# Patient Record
Sex: MALE | Race: WHITE | URBAN - METROPOLITAN AREA
[De-identification: names, ages, dates, MRNs, and addresses within clinical notes are randomized per-mention and may not be internally consistent; named-entity substitution may affect disease eponyms.]

---

## 2017-01-12 ENCOUNTER — HISTORICAL (OUTPATIENT)
Dept: ADMINISTRATIVE | Facility: HOSPITAL | Age: 57
End: 2017-01-12

## 2018-04-30 ENCOUNTER — HOSPITAL ENCOUNTER (OUTPATIENT)
Dept: EMERGENCY MEDICINE | Facility: HOSPITAL | Age: 58
End: 2018-04-30
Attending: INTERNAL MEDICINE | Admitting: INTERNAL MEDICINE

## 2018-04-30 LAB
ABS NEUT (OLG): 4.97 X10(3)/MCL (ref 2.1–9.2)
ALBUMIN SERPL-MCNC: 4 GM/DL (ref 3.4–5)
ALBUMIN/GLOB SERPL: 1.1 RATIO (ref 1.1–2)
ALP SERPL-CCNC: 72 UNIT/L (ref 50–136)
ALT SERPL-CCNC: 34 UNIT/L (ref 12–78)
APTT PPP: 30.3 SECOND(S) (ref 24.8–36.9)
AST SERPL-CCNC: 25 UNIT/L (ref 15–37)
BASOPHILS # BLD AUTO: 0 X10(3)/MCL (ref 0–0.2)
BASOPHILS NFR BLD AUTO: 1 %
BILIRUB SERPL-MCNC: 1.6 MG/DL (ref 0.2–1)
BILIRUBIN DIRECT+TOT PNL SERPL-MCNC: 0.2 MG/DL (ref 0–0.5)
BILIRUBIN DIRECT+TOT PNL SERPL-MCNC: 1.4 MG/DL (ref 0–0.8)
BUN SERPL-MCNC: 13 MG/DL (ref 7–18)
CALCIUM SERPL-MCNC: 9.6 MG/DL (ref 8.5–10.1)
CHLORIDE SERPL-SCNC: 106 MMOL/L (ref 98–107)
CO2 SERPL-SCNC: 20 MMOL/L (ref 21–32)
CREAT SERPL-MCNC: 1.7 MG/DL (ref 0.7–1.3)
EOSINOPHIL # BLD AUTO: 0.1 X10(3)/MCL (ref 0–0.9)
EOSINOPHIL NFR BLD AUTO: 2 %
ERYTHROCYTE [DISTWIDTH] IN BLOOD BY AUTOMATED COUNT: 12.4 % (ref 11.5–17)
GLOBULIN SER-MCNC: 3.5 GM/DL (ref 2.4–3.5)
GLUCOSE SERPL-MCNC: 138 MG/DL (ref 74–106)
HCT VFR BLD AUTO: 43.7 % (ref 42–52)
HGB BLD-MCNC: 14.7 GM/DL (ref 14–18)
INR PPP: 0.99 (ref 0–1.27)
LYMPHOCYTES # BLD AUTO: 1.7 X10(3)/MCL (ref 0.6–4.6)
LYMPHOCYTES NFR BLD AUTO: 23 %
MCH RBC QN AUTO: 29.7 PG (ref 27–31)
MCHC RBC AUTO-ENTMCNC: 33.6 GM/DL (ref 33–36)
MCV RBC AUTO: 88.3 FL (ref 80–94)
MONOCYTES # BLD AUTO: 0.6 X10(3)/MCL (ref 0.1–1.3)
MONOCYTES NFR BLD AUTO: 9 %
NEUTROPHILS # BLD AUTO: 4.97 X10(3)/MCL (ref 2.1–9.2)
NEUTROPHILS NFR BLD AUTO: 66 %
PLATELET # BLD AUTO: 253 X10(3)/MCL (ref 130–400)
PMV BLD AUTO: 10.1 FL (ref 9.4–12.4)
POC TROPONIN: 0 NG/ML (ref 0–0.08)
POTASSIUM SERPL-SCNC: 3.9 MMOL/L (ref 3.5–5.1)
PROT SERPL-MCNC: 7.5 GM/DL (ref 6.4–8.2)
PROTHROMBIN TIME: 13.4 SECOND(S) (ref 12.2–14.7)
RBC # BLD AUTO: 4.95 X10(6)/MCL (ref 4.7–6.1)
SODIUM SERPL-SCNC: 139 MMOL/L (ref 136–145)
WBC # SPEC AUTO: 7.5 X10(3)/MCL (ref 4.5–11.5)

## 2019-08-27 ENCOUNTER — HISTORICAL (OUTPATIENT)
Dept: ADMINISTRATIVE | Facility: HOSPITAL | Age: 59
End: 2019-08-27

## 2019-08-27 LAB
ABS NEUT (OLG): 4.17 X10(3)/MCL (ref 2.1–9.2)
ALBUMIN SERPL-MCNC: 4.4 GM/DL (ref 3.4–5)
ALBUMIN/GLOB SERPL: 1.1 RATIO (ref 1.1–2)
ALP SERPL-CCNC: 79 UNIT/L (ref 45–117)
ALT SERPL-CCNC: 42 UNIT/L (ref 12–78)
APPEARANCE, UA: CLEAR
AST SERPL-CCNC: 22 UNIT/L (ref 15–37)
BACTERIA #/AREA URNS AUTO: ABNORMAL /[HPF]
BASOPHILS # BLD AUTO: 0 X10(3)/MCL (ref 0–0.2)
BASOPHILS NFR BLD AUTO: 1 %
BILIRUB SERPL-MCNC: 1.1 MG/DL (ref 0.2–1)
BILIRUB UR QL STRIP: NEGATIVE
BILIRUBIN DIRECT+TOT PNL SERPL-MCNC: 0.2 MG/DL
BILIRUBIN DIRECT+TOT PNL SERPL-MCNC: 0.9 MG/DL
BUN SERPL-MCNC: 16 MG/DL (ref 7–18)
CALCIUM SERPL-MCNC: 9.4 MG/DL (ref 8.5–10.1)
CHLORIDE SERPL-SCNC: 104 MMOL/L (ref 98–107)
CO2 SERPL-SCNC: 27 MMOL/L (ref 21–32)
COLOR UR: YELLOW
CREAT SERPL-MCNC: 1.3 MG/DL (ref 0.6–1.3)
CREAT UR-MCNC: 150 MG/DL
EOSINOPHIL # BLD AUTO: 0.1 X10(3)/MCL (ref 0–0.9)
EOSINOPHIL NFR BLD AUTO: 1 %
ERYTHROCYTE [DISTWIDTH] IN BLOOD BY AUTOMATED COUNT: 12 % (ref 11.5–14.5)
EST. AVERAGE GLUCOSE BLD GHB EST-MCNC: 114 MG/DL
GLOBULIN SER-MCNC: 4 GM/ML (ref 2.3–3.5)
GLUCOSE (UA): NORMAL
GLUCOSE SERPL-MCNC: 103 MG/DL (ref 74–106)
HAV IGM SERPL QL IA: NONREACTIVE
HBA1C MFR BLD: 5.6 % (ref 4.2–6.3)
HBV CORE IGM SERPL QL IA: NONREACTIVE
HBV SURFACE AG SERPL QL IA: NEGATIVE
HCT VFR BLD AUTO: 43.4 % (ref 40–51)
HCV AB SERPL QL IA: NONREACTIVE
HGB BLD-MCNC: 14.1 GM/DL (ref 13.5–17.5)
HGB UR QL STRIP: NEGATIVE
HIV 1+2 AB+HIV1 P24 AG SERPL QL IA: NONREACTIVE
HYALINE CASTS #/AREA URNS LPF: ABNORMAL /[LPF]
IMM GRANULOCYTES # BLD AUTO: 0.02 10*3/UL
IMM GRANULOCYTES NFR BLD AUTO: 0 %
KETONES UR QL STRIP: NEGATIVE
LEUKOCYTE ESTERASE UR QL STRIP: NEGATIVE
LYMPHOCYTES # BLD AUTO: 1.7 X10(3)/MCL (ref 0.6–4.6)
LYMPHOCYTES NFR BLD AUTO: 27 % (ref 13–40)
MCH RBC QN AUTO: 29.6 PG (ref 26–34)
MCHC RBC AUTO-ENTMCNC: 32.5 GM/DL (ref 31–37)
MCV RBC AUTO: 91 FL (ref 80–100)
MONOCYTES # BLD AUTO: 0.5 X10(3)/MCL (ref 0.1–1.3)
MONOCYTES NFR BLD AUTO: 8 % (ref 0–24)
NEUTROPHILS # BLD AUTO: 4.17 X10(3)/MCL (ref 2.1–9.2)
NEUTROPHILS NFR BLD AUTO: 64 X10(3)/MCL
NITRITE UR QL STRIP: NEGATIVE
PH UR STRIP: 6 [PH] (ref 4.5–8)
PLATELET # BLD AUTO: 343 X10(3)/MCL (ref 130–400)
PMV BLD AUTO: 9.9 FL (ref 7.4–10.4)
POTASSIUM SERPL-SCNC: 4.1 MMOL/L (ref 3.5–5.1)
PROT SERPL-MCNC: 8.4 GM/DL (ref 6.4–8.2)
PROT UR QL STRIP: 10 MG/DL
PROT UR STRIP-MCNC: 15.1 MG/DL
PROT/CREAT UR-RTO: 100.7 MG/GM
RBC # BLD AUTO: 4.77 X10(6)/MCL (ref 4.5–5.9)
RBC #/AREA URNS AUTO: ABNORMAL /[HPF]
SODIUM SERPL-SCNC: 138 MMOL/L (ref 136–145)
SP GR UR STRIP: 1.02 (ref 1–1.03)
SQUAMOUS #/AREA URNS LPF: ABNORMAL /[LPF]
TSH SERPL-ACNC: 1.39 MIU/L (ref 0.36–3.74)
UROBILINOGEN UR STRIP-ACNC: NORMAL
WBC # SPEC AUTO: 6.6 X10(3)/MCL (ref 4.5–11)
WBC #/AREA URNS AUTO: ABNORMAL /HPF

## 2019-09-13 ENCOUNTER — HISTORICAL (OUTPATIENT)
Dept: INTERNAL MEDICINE | Facility: CLINIC | Age: 59
End: 2019-09-13

## 2019-10-01 ENCOUNTER — HISTORICAL (OUTPATIENT)
Dept: ADMINISTRATIVE | Facility: HOSPITAL | Age: 59
End: 2019-10-01

## 2019-10-03 LAB — FINAL CULTURE: NORMAL

## 2019-10-12 ENCOUNTER — HISTORICAL (OUTPATIENT)
Dept: ADMINISTRATIVE | Facility: HOSPITAL | Age: 59
End: 2019-10-12

## 2019-10-15 ENCOUNTER — HISTORICAL (OUTPATIENT)
Dept: RADIOLOGY | Facility: HOSPITAL | Age: 59
End: 2019-10-15

## 2019-11-25 ENCOUNTER — HISTORICAL (OUTPATIENT)
Dept: INTERNAL MEDICINE | Facility: CLINIC | Age: 59
End: 2019-11-25

## 2019-11-25 LAB
ALBUMIN SERPL-MCNC: 3.9 GM/DL (ref 3.4–5)
ALBUMIN/GLOB SERPL: 1.1 RATIO (ref 1.1–2)
ALP SERPL-CCNC: 72 UNIT/L (ref 45–117)
ALT SERPL-CCNC: 42 UNIT/L (ref 12–78)
AST SERPL-CCNC: 26 UNIT/L (ref 15–37)
BILIRUB SERPL-MCNC: 1.4 MG/DL (ref 0.2–1)
BILIRUBIN DIRECT+TOT PNL SERPL-MCNC: 0.2 MG/DL (ref 0–0.2)
BILIRUBIN DIRECT+TOT PNL SERPL-MCNC: 1.2 MG/DL
BUN SERPL-MCNC: 21 MG/DL (ref 7–18)
CALCIUM SERPL-MCNC: 8.7 MG/DL (ref 8.5–10.1)
CHLORIDE SERPL-SCNC: 106 MMOL/L (ref 98–107)
CO2 SERPL-SCNC: 26 MMOL/L (ref 21–32)
CREAT SERPL-MCNC: 1.4 MG/DL (ref 0.6–1.3)
GLOBULIN SER-MCNC: 3.5 GM/ML (ref 2.3–3.5)
GLUCOSE SERPL-MCNC: 109 MG/DL (ref 74–106)
POTASSIUM SERPL-SCNC: 4.6 MMOL/L (ref 3.5–5.1)
PROT SERPL-MCNC: 7.4 GM/DL (ref 6.4–8.2)
SODIUM SERPL-SCNC: 138 MMOL/L (ref 136–145)

## 2022-04-10 ENCOUNTER — HISTORICAL (OUTPATIENT)
Dept: ADMINISTRATIVE | Facility: HOSPITAL | Age: 62
End: 2022-04-10

## 2022-04-26 VITALS
OXYGEN SATURATION: 95 % | SYSTOLIC BLOOD PRESSURE: 131 MMHG | BODY MASS INDEX: 35.36 KG/M2 | WEIGHT: 238.75 LBS | DIASTOLIC BLOOD PRESSURE: 84 MMHG | HEIGHT: 69 IN

## 2022-04-30 NOTE — ED PROVIDER NOTES
"   Patient:   Rory Carmona             MRN: [id number]            FIN: [id number]               Age:   57 years     Sex:  Male     :  1960   Associated Diagnoses:   Syncope   Author:   Moose Hurley MD      Basic Information   Time seen: Date & time 2018 11:47:00.   History source: Patient.   Arrival mode: Ambulance.   History limitation: None.   Additional information: Chief Complaint from Nursing Triage Note : Chief Complaint   2018 9:46 CDT       Chief Complaint           pt to ER via [facility] for syncope.  pt was having routine physical and had syncopal episode.  pt was pale and diaphoretic and felt "woozy" prior.  pt had short CP episode, vomited and pain went away.  pt remains pale and nauseated  .      History of Present Illness   The patient presents with 56 y/o M with PMH of HTN, HLD presents following syncopal episode.  was at work physical which included strenuous work-out.  passed out during work-out.  +LOC, no head trauma.  Reports chest pain and nausea after episode.  vomit x 1 , no blood, no bile.  Chest pain resolved with vomiting.  +dizziness.  no palpitations, no sob.  recently ill with episodes of diarrhea and vomiting which had started to improve yesterday.  Reports he had been off his BP meds, then started to take his cozaar again prior to his physical today.   .  The onset was just prior to arrival.  The location where the incident occurred was at work.  There are exacerbating factors including exertion and standing.  The relieving factor is rest.  Risk factors consist of hypertension, obesity and age.  Therapy today: none.  Preceding symptoms: lightheaded nausea, no palpitations, no headache no vision change.  Associated symptoms: chest pain, nausea, vomiting, dizziness, denies abdominal pain, denies fever, denies chills, denies shortness of breath and denies headache.  Associated injury to the none.        Review of Systems   Constitutional symptoms:  No fever, no " fatigue.    Skin symptoms:  No rash,    Eye symptoms:  No diplopia, no blurred vision.    ENMT symptoms:  No sore throat, no nasal congestion, no sinus pain.    Respiratory symptoms:  No shortness of breath, no orthopnea, no cough, no hemoptysis, no sputum production.    Cardiovascular symptoms:  Negative except as documented in HPI.   Gastrointestinal symptoms:  No abdominal pain, no nausea, no vomiting, no diarrhea, no constipation.    Genitourinary symptoms:  No dysuria,    Musculoskeletal symptoms:  No back pain,    Neurologic symptoms:  Dizziness, no headache, no altered level of consciousness, no weakness.    Psychiatric symptoms:  No anxiety,    Hematologic/Lymphatic symptoms:  Bleeding tendency negative, bruising tendency negative, no petechiae, no gum bleeding.       Health Status   Allergies:    Allergies (1) Active Reaction  Pollen None Documented  .   Medications:  (Selected)   Prescriptions  Prescribed  losartan 100 mg oral tablet: See Instructions, take 1 tablet by mouth once daily, # 30 tab(s), 5 Refill(s), eRx: [id number]  Documented Medications  Documented  Template Non-Formulary Med: Oral, Daily, olive leaf extract, 0 Refill(s)  Zyrtec 10 mg oral tablet: 10 mg = 1 tab(s), Oral, Daily, # 30 tab(s), 0 Refill(s)  niacin 500 mg oral tablet: 500 mg = 1 tab(s), Oral, At Bedtime, 0 Refill(s)  ranitidine 75 mg oral tablet: 150 mg = 2 tab(s), Oral, At Bedtime, 0 Refill(s).   Immunizations: Up to date.      Past Medical/ Family/ Social History   Medical history:    Active  Biceps tendon tear ([id number])  Resolved  Able to lie down ([id number]):  Resolved.  ET - Exercise tolerance ([id number]):  Resolved.  Wears glasses ([id number]):  Resolved.  Seasonal allergies ([id number]):  Resolved.  Chronic sinus infection ([id number]):  Resolved.  Acid reflux                                                                                                                                                                                                                              26-FEB-2015 00:10:12<$> ([id number]):  Resolved.  Obesity ([id number]):  Resolved..   Surgical history:    Intramedullary Seth Insertion Tibia (Left) on 1/18/2016 at 55 Years.  Comments:  1/18/2016 13:Reece - Alaina Murcia RN  auto-populated from documented surgical case  ORIF Fibula (Left) on 1/18/2016 at 55 Years.  Comments:  1/18/2016 13:Reece - Alaina Murcia RN  auto-populated from documented surgical case  Biceps Tendon Repair (Right) on 5/28/2015 at 54 Years.  Comments:  5/28/2015 14:33 - Elen Jaquez RN  auto-populated from documented surgical case  Colonoscopy ([id number]).  CARLITOS ([id number]).  Comments:  5/20/2015 12:32 - Phil CARDOSO , Roseann CONDON   Social history:    Social & Psychosocial Habits    Alcohol  05/20/2015  Use: Current    Type: Beer    Frequency: 1-2 times per month    Employment/School  05/20/2015  Status: Employed    Highest education: University degree(s)    Home/Environment  12/16/2015  Lives with: Spouse    Nutrition/Health  12/16/2015  Type of diet: Regular    Substance Abuse  05/20/2015 Risk Assessment: Denies Substance Abuse    12/16/2015  Use: Never    Tobacco  05/20/2015 Risk Assessment: Denies Tobacco Use    12/16/2015  Use: Never smoker  .      Physical Examination               Vital Signs   Vital Signs   4/30/2018 11:50 CDT      Peripheral Pulse Rate     67 bpm                             Respiratory Rate          18 br/min                             SpO2                      99 %                             Oxygen Therapy            Room air                             Systolic Blood Pressure   155 mmHg  HI                             Diastolic Blood Pressure  88 mmHg                             Mean Arterial Pressure, Cuff              110 mmHg    4/30/2018 10:50 CDT      Peripheral Pulse Rate      67 bpm                             Respiratory Rate          16 br/min                             SpO2                      100 %                             Oxygen Therapy            Room air                             Systolic Blood Pressure   131 mmHg                             Diastolic Blood Pressure  82 mmHg                             Mean Arterial Pressure, Cuff              98 mmHg    4/30/2018 9:57 CDT       Peripheral Pulse Rate     63 bpm                             Respiratory Rate          16 br/min                             SpO2                      100 %                             Oxygen Therapy            Room air                             Systolic Blood Pressure   116 mmHg                             Diastolic Blood Pressure  71 mmHg                             Mean Arterial Pressure, Cuff              86 mmHg    4/30/2018 9:46 CDT       Temperature Oral          36.4 DegC                             Temperature Oral (calculated)             97.52 DegF                             Peripheral Pulse Rate     73 bpm                             Respiratory Rate          16 br/min                             SpO2                      100 %                             Oxygen Therapy            Room air                             Systolic Blood Pressure   112 mmHg                             Diastolic Blood Pressure  70 mmHg  .   General:  Alert, no acute distress.    Christine coma scale:  Total score: Total score: 15.   Neurological:  Alert and oriented to person, place, time, and situation, No focal neurological deficit observed, CN II-XII intact, normal sensory observed, normal motor observed, normal speech observed, normal coordination observed.    Skin:  Warm, dry.    Head:  Normocephalic.   Neck:  No JVD, no carotid bruit.    Eye:  Pupils are equal, round and reactive to light, extraocular movements are intact.    Ears, nose, mouth and throat:  Oral mucosa moist, no pharyngeal erythema or  exudate.    Cardiovascular:  Regular rate and rhythm, No murmur, Normal peripheral perfusion, No edema.    Respiratory:  Lungs are clear to auscultation, respirations are non-labored, breath sounds are equal, Symmetrical chest wall expansion.    Gastrointestinal:  Soft, Nontender, Non distended, Normal bowel sounds, No organomegaly.    Back:  Normal range of motion.   Musculoskeletal:  Normal ROM, no deformity.    Lymphatics      Medical Decision Making   Differential Diagnosis:  Syncope, acute myocardial infarction, dysrhythmia, anemia, vasovagal episode.    Electrocardiogram:  Time 4/30/2018 09:48:00, rate 69, normal sinus rhythm, No ST-T changes, no ectopy, normal HI & QRS intervals, EP Interp.    Results review:  Lab results : Lab View   4/30/2018 10:31 CDT      Troponin I Poc            0.00    4/30/2018 10:29 CDT      Sodium Lvl                139 mmol/L                             Potassium Lvl             3.9 mmol/L                             Chloride                  106 mmol/L                             CO2                       20.0 mmol/L  LOW                             Calcium Lvl               9.6 mg/dL                             Glucose Lvl               138 mg/dL  HI                             BUN                       13.0 mg/dL                             Creatinine                1.70 mg/dL  HI                             eGFR-AA                   54 mL/min/1.73 m2  NA                             eGFR-MILLI                  45 mL/min/1.73 m2  NA                             Bili Total                1.6 mg/dL  HI                             Bili Direct               0.20 mg/dL                             Bili Indirect             1.40 mg/dL  HI                             AST                       25 unit/L                             ALT                       34 unit/L                             Alk Phos                  72 unit/L                             Total Protein             7.5 gm/dL                              Albumin Lvl               4.00 gm/dL                             Globulin                  3.50 gm/dL                             A/G Ratio                 1.1 ratio                             WBC                       7.5 x10(3)/mcL                             RBC                       4.95 x10(6)/mcL                             Hgb                       14.7 gm/dL                             Hct                       43.7 %                             Platelet                  253 x10(3)/mcL                             MCV                       88.3 fL                             MCH                       29.7 pg                             MCHC                      33.6 gm/dL                             RDW                       12.4 %                             MPV                       10.1 fL                             Abs Neut                  4.97 x10(3)/mcL                             Neutro Auto               66 %  NA                             Lymph Auto                23 %  NA                             Mono Auto                 9 %  NA                             Eos Auto                  2 %  NA                             Abs Eos                   0.1 x10(3)/mcL                             Basophil Auto             1 %  NA                             Abs Neutro                4.97 x10(3)/mcL                             Abs Lymph                 1.7 x10(3)/mcL                             Abs Mono                  0.6 x10(3)/mcL                             Abs Baso                  0.0 x10(3)/mcL  .   Notes:  results reviewed. Likely syncopal 2/2 dehydration, but given age, history of HTN, and associated chest pain and vomit in setting of syncopal episode will call for admit for r/o acs and syncopal episode..      Reexamination/ Reevaluation   Time: 4/30/2018 11:55:00 .   Vital signs   results included from flowsheet : Vital Signs   4/30/2018 11:50 CDT      Peripheral  Pulse Rate     67 bpm                             Respiratory Rate          18 br/min                             SpO2                      99 %                             Oxygen Therapy            Room air                             Systolic Blood Pressure   155 mmHg  HI                             Diastolic Blood Pressure  88 mmHg                             Mean Arterial Pressure, Cuff              110 mmHg    4/30/2018 10:50 CDT      Peripheral Pulse Rate     67 bpm                             Respiratory Rate          16 br/min                             SpO2                      100 %                             Oxygen Therapy            Room air                             Systolic Blood Pressure   131 mmHg                             Diastolic Blood Pressure  82 mmHg                             Mean Arterial Pressure, Cuff              98 mmHg    4/30/2018 9:57 CDT       Peripheral Pulse Rate     63 bpm                             Respiratory Rate          16 br/min                             SpO2                      100 %                             Oxygen Therapy            Room air                             Systolic Blood Pressure   116 mmHg                             Diastolic Blood Pressure  71 mmHg                             Mean Arterial Pressure, Cuff              86 mmHg    4/30/2018 9:46 CDT       Temperature Oral          36.4 DegC                             Temperature Oral (calculated)             97.52 DegF                             Peripheral Pulse Rate     73 bpm                             Respiratory Rate          16 br/min                             SpO2                      100 %                             Oxygen Therapy            Room air                             Systolic Blood Pressure   112 mmHg                             Diastolic Blood Pressure  70 mmHg     Notes: reports improved after IV fluids, feels less fatigued..      Impression and Plan   Diagnosis   Syncope  ([id number] [id number])      Calls-Consults   -  4/30/2018 11:55:00 .   Plan   Condition: Guarded.    Disposition: Admit time  4/30/2018 11:56:00, Place in Observation Telemetry Unit, Bj Menezes MD    Counseled: Patient, Family, Regarding diagnosis, Regarding treatment plan, Regarding prescription, Patient indicated understanding of instructions.       Addendum      Teaching-Supervisory Addendum-Brief   I participated in the following activities of this patients care: the medical history, the physical exam, medical decision making.   I personally performed: supervision of the patient's care, the medical history, the physical exam, the medical decision making.   The case was discussed with: the resident.   Results interpretation: I agree with the study interpretation in this patient's care.   Notes: I have seen the pt with the resident and I have performed an independent history and physical.      This is a 57-year-old male with a history of hypertension.  The patient says that he's had some recent flulike illness and diarrhea and was taking over-the-counter medication for his symptoms that he thinks included a decongestant.  The patient says his blood pressure was high - probably due to the decongestants and also due to the fact that he has not been taking his prescribed Cozaar for blood pressure.  The patient says that once she notices blood pressure was high.  Taken the nasal decongestant and started doubling the dose of his Cozaar for about 3 days taking it twice a day instead of once a day to try to regain control of his blood pressure.    The patient says that this morning his blood pressure was very good - and he had a work physical to go to - when he got there he says that they had him do several very demanding physical exercises including going up stairs and lifting heavy weights - he says that he began to feel lightheaded and had to sit down.  It is reported that he vomited and passed out in a chair but did not  fall and hit his head.  The patient says that at some point he had some pain in his chest but thought it was just due to the reflux from throwing up.    On arrival here the patient has an essentially normal workup and a nonischemic appearing EKG.  Patient received IV fluids and felt much better and his blood pressure remained stable.  Given the patient did have some syncope and chest pain - I advised that we observe him overnight for cardiac monitoring and repeat cardiac enzymes - though it is quite plausible that he simply had some reflux from vomiting - and had a syncopal episode due to doubling his blood pressure medication for the last 3 days.    Patient was admitted for observation however he left after being admitted.  .